# Patient Record
Sex: MALE | Race: WHITE | NOT HISPANIC OR LATINO | URBAN - METROPOLITAN AREA
[De-identification: names, ages, dates, MRNs, and addresses within clinical notes are randomized per-mention and may not be internally consistent; named-entity substitution may affect disease eponyms.]

---

## 2017-12-05 ENCOUNTER — ALLSCRIPTS OFFICE VISIT (OUTPATIENT)
Dept: OTHER | Facility: OTHER | Age: 57
End: 2017-12-05

## 2017-12-05 DIAGNOSIS — Z13.1 ENCOUNTER FOR SCREENING FOR DIABETES MELLITUS: ICD-10-CM

## 2017-12-05 DIAGNOSIS — Z12.5 ENCOUNTER FOR SCREENING FOR MALIGNANT NEOPLASM OF PROSTATE: ICD-10-CM

## 2017-12-05 DIAGNOSIS — M50.322 OTHER CERVICAL DISC DEGENERATION AT C5-C6 LEVEL: ICD-10-CM

## 2017-12-05 DIAGNOSIS — Z13.220 ENCOUNTER FOR SCREENING FOR LIPOID DISORDERS: ICD-10-CM

## 2017-12-05 DIAGNOSIS — Z13.0 ENCOUNTER FOR SCREENING FOR DISEASES OF THE BLOOD AND BLOOD-FORMING ORGANS AND CERTAIN DISORDERS INVOLVING THE IMMUNE MECHANISM: ICD-10-CM

## 2017-12-05 DIAGNOSIS — M54.12 RADICULOPATHY OF CERVICAL REGION: ICD-10-CM

## 2017-12-06 ENCOUNTER — GENERIC CONVERSION - ENCOUNTER (OUTPATIENT)
Dept: OTHER | Facility: OTHER | Age: 57
End: 2017-12-06

## 2017-12-06 ENCOUNTER — ALLSCRIPTS OFFICE VISIT (OUTPATIENT)
Dept: OTHER | Facility: OTHER | Age: 57
End: 2017-12-06

## 2017-12-06 NOTE — PROGRESS NOTES
Assessment    1  Degeneration of C5-C6 intervertebral disc (722 4) (M50 322)   2  Cervical neuritis (723 4) (M54 12)    Plan  Cervical neuritis    · Cyclobenzaprine HCl - 10 MG Oral Tablet; TAKE 1 TAB AT BEDTIME   · PredniSONE 20 MG Oral Tablet; TAKE 3 TABLET Daily take all 3 20mg pills at thesame time TDD:60mg  Cervical neuritis, Degeneration of C5-C6 intervertebral disc    · *1 - [facility]  *  Status: Active  Requested for: 38WBV2125  Care Summary provided  : Yes  Depression screen    · *VB-Depression Screening; Status:Complete;   Done: 47TMM7604 09:39AM   · *VB-Depression Screening ; every 1 year; Last 74CHZ9488; Next 90Mmv4533;Status:Active  PMH: History of influenza vaccination    · Stop: Fluzone Quadrivalent Intramuscular Suspension    Discussion/Summary  Possible side effects of new medications were reviewed with the patient/guardian today  The treatment plan was reviewed with the patient/guardian  The patient/guardian understands and agrees with the treatment plan      Chief Complaint  PT C/O NECK FOR THE FEW WEEKS, WITH PAIN DOWN INTO HIS SHOULDER, ARM AND NUMBNESS AT TIMES  PT IS DUE FOR HIS ROUTINE YEARLY BW  PT HAS DEFERRED HIS FLU SHOT        History of Present Illness  HPI: pt is here c/o neck pain radiating into the left armfirst time he had this problem was 3 years ago - had it for 6 weekspain, nothing helped as far as he remembersended up seeing Pain Management and having an Radha Garcia told he had degenerative disease in C5-C6 of the cervical spinethe time he had the MRI the pain was resolving and he did not end up needing any injectionsdid not do physical therapy  really has been fine for 3 years until recently, he had a mild flare over the summer4 weeksthe pain in the neck and tingling into his fingerswas considerably more mild than when he had a few years ago and was able to ignore itwas gone for about a month but it has come back and now he has had it for 4 weekswith a stiff neckgoes down the left armnumbness in the fingers, at least not as much as prior episodes  heavy suitcases often3 advil every 3 hoursin the car is almost impossibleat night is the worsehelps a tiny bit         Active Problems  1  Abdominal pain (789 00) (R10 9)   2  Cervical neuritis (723 4) (M54 12)   3  Colonoscopy (Fiberoptic) Screening   4  Degeneration of C5-C6 intervertebral disc (722 4) (M50 322)   5  Depression screen (V79 0) (Z13 89)   6  Hemorrhoids (455 6) (K64 9)   7  Lumbar Sprain (847 2)   8  Neck pain (723 1) (M54 2)   9  Nocturia (788 43) (R35 1)    Past Medical History  1  History of Basal cell carcinoma of face (173 31) (C44 310)   2  History of Closed Fracture Of The Left Medial Tibial Plateau (Schatzker 4) (823 00)   3  History of basal cell carcinoma (V10 83) (Z85 828)   4  History of cholelithiasis (V12 79) (Z87 19)   5  History of Need for Tdap vaccination (V06 1) (Z23)   6  History of Ventral hernia (553 20) (K43 9)  Active Problems And Past Medical History Reviewed: The active problems and past medical history were reviewed and updated today  Family History  Mother    1  Family history of Diabetes Mellitus (V18 0)  Father    2  Family history of Brain Cancer (V16 8)   3  Family history of Lung Cancer (V16 1)   4  Family history of Malignant Neoplasm Of The Liver  Family History Reviewed: The family history was reviewed and updated today  Social History   · Being A Social Drinker   · Never a smoker   · Nonsmoker (V49 89) (Z78 9)  The social history was reviewed and updated today  Surgical History    1  History of Cholecystectomy  Surgical History Reviewed: The surgical history was reviewed and updated today  Current Meds   1  Advil 200 MG Oral Tablet; Therapy: (Recorded:53Crd7075) to Recorded    The medication list was reviewed and updated today  Allergies  1  Novocain SOLN   2   Penicillins    Vitals   Recorded: 63THP8387 09:34AM   Temperature 98 2 F   Heart Rate 98   Systolic 703   Diastolic 80   Height 5 ft 9 5 in   Weight 227 lb 12 oz   BMI Calculated 33 15   BSA Calculated 2 19   O2 Saturation 98       Physical Exam   Constitutional  General appearance: No acute distress, well appearing and well nourished  Musculoskeletal  Gait and station: Normal    Inspection/palpation of joints, bones, and muscles: Abnormal  -- (no TTP over the cervical spine but positive tenderness and spasm of the left trapezius muscle, no tenderness over the shoulder with full range of motion of the shoulder, no edema or erythema, full range of motion of the neck in all directions except for tilting his neck to the left, positive Spurling's on the left)  Skin  Skin and subcutaneous tissue: Normal without rashes or lesions  Neurologic  Sensation: No sensory loss  Psychiatric  Orientation to person, place and time: Normal    Mood and affect: Normal          Results/Data  *VB-Depression Screening 91TXZ9529 09:39AM Oralia Inman     Test Name Result Flag Reference   Depression Scale Result      Depression Screen - Negative For Symptoms     PHQ-2 Adult Depression Screening 19QEX3228 09:36AM User, s     Test Name Result Flag Reference   PHQ-2 Adult Depression Score 0       Over the last two weeks, how often have you been bothered by any of the following problems?  Little interest or pleasure in doing things: Not at all - 0 Feeling down, depressed, or hopeless: Not at all - 0   PHQ-2 Adult Depression Screening Negative           Signatures   Electronically signed by : LEONILA Campos ; Dec  5 2017  9:58AM EST                       (Author)

## 2017-12-07 ENCOUNTER — GENERIC CONVERSION - ENCOUNTER (OUTPATIENT)
Dept: OTHER | Facility: OTHER | Age: 57
End: 2017-12-07

## 2017-12-07 LAB
A/G RATIO (HISTORICAL): 1.6 (ref 1.2–2.2)
ALBUMIN SERPL BCP-MCNC: 4.2 G/DL (ref 3.5–5.5)
ALP SERPL-CCNC: 70 IU/L (ref 39–117)
ALT SERPL W P-5'-P-CCNC: 28 IU/L (ref 0–44)
AST SERPL W P-5'-P-CCNC: 21 IU/L (ref 0–40)
BASOPHILS # BLD AUTO: 0 %
BASOPHILS # BLD AUTO: 0 X10E3/UL (ref 0–0.2)
BILIRUB SERPL-MCNC: 0.6 MG/DL (ref 0–1.2)
BUN SERPL-MCNC: 15 MG/DL (ref 6–24)
BUN/CREA RATIO (HISTORICAL): 15 (ref 9–20)
CALCIUM SERPL-MCNC: 9.1 MG/DL (ref 8.7–10.2)
CHLORIDE SERPL-SCNC: 107 MMOL/L (ref 96–106)
CHOLEST SERPL-MCNC: 229 MG/DL (ref 100–199)
CO2 SERPL-SCNC: 24 MMOL/L (ref 18–29)
CREAT SERPL-MCNC: 1.02 MG/DL (ref 0.76–1.27)
DEPRECATED RDW RBC AUTO: 13.5 % (ref 12.3–15.4)
EGFR AFRICAN AMERICAN (HISTORICAL): 93 ML/MIN/1.73
EGFR-AMERICAN CALC (HISTORICAL): 80 ML/MIN/1.73
EOSINOPHIL # BLD AUTO: 0.1 X10E3/UL (ref 0–0.4)
EOSINOPHIL # BLD AUTO: 1 %
GLUCOSE SERPL-MCNC: 91 MG/DL (ref 65–99)
HCT VFR BLD AUTO: 45.8 % (ref 37.5–51)
HDLC SERPL-MCNC: 62 MG/DL
HGB BLD-MCNC: 15.9 G/DL (ref 13–17.7)
IMM.GRANULOCYTES (CD4/8) (HISTORICAL): 0 %
IMM.GRANULOCYTES (CD4/8) (HISTORICAL): 0 X10E3/UL (ref 0–0.1)
LDLC SERPL CALC-MCNC: 146 MG/DL (ref 0–99)
LYMPHOCYTES # BLD AUTO: 2.5 X10E3/UL (ref 0.7–3.1)
LYMPHOCYTES # BLD AUTO: 20 %
MCH RBC QN AUTO: 31 PG (ref 26.6–33)
MCHC RBC AUTO-ENTMCNC: 34.7 G/DL (ref 31.5–35.7)
MCV RBC AUTO: 89 FL (ref 79–97)
MONOCYTES # BLD AUTO: 1.1 X10E3/UL (ref 0.1–0.9)
MONOCYTES (HISTORICAL): 9 %
NEUTROPHILS # BLD AUTO: 70 %
NEUTROPHILS # BLD AUTO: 8.6 X10E3/UL (ref 1.4–7)
PLATELET # BLD AUTO: 245 X10E3/UL (ref 150–379)
POTASSIUM SERPL-SCNC: 4 MMOL/L (ref 3.5–5.2)
PROSTATE SPECIFIC ANTIGEN (HISTORICAL): 1.9 NG/ML (ref 0–4)
RBC (HISTORICAL): 5.13 X10E6/UL (ref 4.14–5.8)
SODIUM SERPL-SCNC: 146 MMOL/L (ref 134–144)
TOT. GLOBULIN, SERUM (HISTORICAL): 2.7 G/DL (ref 1.5–4.5)
TOTAL PROTEIN (HISTORICAL): 6.9 G/DL (ref 6–8.5)
TRIGL SERPL-MCNC: 107 MG/DL (ref 0–149)
VLDLC SERPL CALC-MCNC: 21 MG/DL (ref 5–40)
WBC # BLD AUTO: 12.5 X10E3/UL (ref 3.4–10.8)

## 2018-01-12 NOTE — RESULT NOTES
Verified Results  (1) CBC/PLT/DIFF 20ZBN9944 12:00AM [facility]     Test Name Result Flag Reference   WBC 5 5 x10E3/uL  3 4-10 8   RBC 5 27 x10E6/uL  4 14-5 80   Hemoglobin 15 7 g/dL  12 6-17 7   Hematocrit 47 4 %  37 5-51 0   MCV 90 fL  79-97   MCH 29 8 pg  26 6-33 0   MCHC 33 1 g/dL  31 5-35 7   RDW 13 3 %  12 3-15 4   Platelets 879 V56S4/SO  150-379   Neutrophils 58 %     Lymphs 25 %     Monocytes 12 %     Eos 5 %     Basos 0 %     Neutrophils (Absolute) 3 1 x10E3/uL  1 4-7 0   Lymphs (Absolute) 1 4 x10E3/uL  0 7-3 1   Monocytes(Absolute) 0 7 x10E3/uL  0 1-0 9   Eos (Absolute) 0 3 x10E3/uL  0 0-0 4   Baso (Absolute) 0 0 x10E3/uL  0 0-0 2   Immature Granulocytes 0 %     Immature Grans (Abs) 0 0 x10E3/uL  0 0-0 1     (1) PSA (SCREEN) (Dx V76 44 Screen for Prostate Cancer) 47JZT5621 12:00AM [facility]     Test Name Result Flag Reference   Prostate Specific Ag, Serum 2 5 ng/mL  0 0-4 0   Roche ECLIA methodology  According to the American Urological Association, Serum PSA should  decrease and remain at undetectable levels after radical  prostatectomy  The AUA defines biochemical recurrence as an initial  PSA value 0 2 ng/mL or greater followed by a subsequent confirmatory  PSA value 0 2 ng/mL or greater  Values obtained with different assay methods or kits cannot be used  interchangeably  Results cannot be interpreted as absolute evidence  of the presence or absence of malignant disease  (1) TSH 55MHM0697 12:00AM [facility]     Test Name Result Flag Reference   TSH 2 660 uIU/mL  0 450-4 500       Plan  Depression screen    · *VB-Depression Screening; Status:Complete;   Done: 53YOJ2391 10:08AM  Long term use of drug, Screening for diabetes mellitus, Screening for endocrine,  nutritional, metabolic and immunity disorder, Screening for lipoid disorders, Screening  for thyroid disorder, Special screening, prostate cancer    · (1) LIPID PANEL, FASTING; Status: In Progress - Specimen/Data Collected; Done:  85RXS1799   · (1) TSH; Status:Complete;   Done: 16LTO6102 12:00AM  Need for influenza vaccination    · Fluzone Quadrivalent Intramuscular Suspension; INJECT 0 5  ML  Intramuscular; To Be Done: 79YDH8295  Need for Tdap vaccination    · Tdap (Adacel)  Nocturia, Screening for diabetes mellitus, Screening for endocrine, nutritional, metabolic  and immunity disorder, Screening for lipoid disorders, Screening for thyroid disorder,  Special screening, prostate cancer    · (1) CBC/PLT/DIFF; Status:Resulted - Requires Verification;   Done: 86WLC5610 12:00AM  Nocturia, Urinary frequency    · Urine Dip Non-Automated- POC; Status:Complete;   Done: 41LNR9565 09:31AM   · (1) URINALYSIS w URINE C/S REFLEX (will reflex a microscopy if leukocytes, occult  blood, or nitrites are not within normal limits); Status: In Progress - Specimen/Data  Collected;   Done: 71QCK9714  Screening for diabetes mellitus, Screening for endocrine, nutritional, metabolic and  immunity disorder, Screening for lipoid disorders, Screening for thyroid disorder, Special  screening, prostate cancer    · (1) COMPREHENSIVE METABOLIC PANEL; Status: In Progress - Specimen/Data  Collected;   Done: 27RDS1662   · (1) PSA (SCREEN) (Dx V76 44 Screen for Prostate Cancer); Status:Resulted - Requires  Verification;   Done: 05IZD5277 12:00AM  Screening for diabetes mellitus, Screening for lipoid disorders, Screening for thyroid  disorder, Special screening, prostate cancer    · Routine Venipuncture - POC; Status:Complete;   Done: 81HTU8131    Discussion/Summary   cbc ,thyroid, and PSA are normal   others are still pending

## 2018-01-13 NOTE — RESULT NOTES
Verified Results  (1) COMPREHENSIVE METABOLIC PANEL 54GNL6777 92:29KH Taniya Lizarraga     Test Name Result Flag Reference   Glucose, Serum 105 mg/dL H 65-99   BUN 13 mg/dL  6-24   Creatinine, Serum 1 04 mg/dL  0 76-1 27   eGFR If NonAfricn Am 79 mL/min/1 73  >59   eGFR If Africn Am 91 mL/min/1 73  >59   BUN/Creatinine Ratio 13  9-20   Sodium, Serum 144 mmol/L  136-144   Potassium, Serum 4 5 mmol/L  3 5-5 2   Chloride, Serum 106 mmol/L     Carbon Dioxide, Total 22 mmol/L  18-29   Calcium, Serum 8 6 mg/dL L 8 7-10 2   Protein, Total, Serum 6 9 g/dL  6 0-8 5   Albumin, Serum 4 4 g/dL  3 5-5 5   Globulin, Total 2 5 g/dL  1 5-4 5   A/G Ratio 1 8  1 1-2 5   Bilirubin, Total 0 7 mg/dL  0 0-1 2   Alkaline Phosphatase, S 73 IU/L     AST (SGOT) 31 IU/L  0-40   ALT (SGPT) 32 IU/L  0-44     (1) LIPID PANEL, FASTING 90PGP9660 12:00AM Taniya Lizarraga     Test Name Result Flag Reference   Cholesterol, Total 214 mg/dL H 100-199   Triglycerides 116 mg/dL  0-149   HDL Cholesterol 55 mg/dL  >39   According to ATP-III Guidelines, HDL-C >59 mg/dL is considered a  negative risk factor for CHD  VLDL Cholesterol Dann 23 mg/dL  5-40   LDL Cholesterol Calc 136 mg/dL H 0-99   T  Chol/HDL Ratio 3 9 ratio units  0 0-5 0   T  Chol/HDL Ratio                                                             Men  Women                                               1/2 Avg  Risk  3 4    3 3                                                   Avg Risk  5 0    4 4                                                2X Avg  Risk  9 6    7 1                                                3X Avg  Risk 23 4   11 0       Discussion/Summary   sugar and chol are slightly elevated  watch diet and recheck labs in 3 months

## 2018-01-14 NOTE — PROGRESS NOTES
Assessment    1  Urinary frequency (788 41) (R35 0)   2  Nocturia (788 43) (R35 1)   3  Nonsmoker (V49 89) (Z78 9)   4  Encounter for preventive health examination (V70 0) (Z00 00)    Plan  Nocturia, Urinary frequency    · Urine Dip Non-Automated- POC; Status:Active - Perform Order; Requested  for:07Nov2016;     Discussion/Summary  Impression: healthy adult male  Currently, he eats a poor diet and has an adequate exercise regimen  Prostate cancer screening: PSA was ordered  Testicular cancer screening: testicular cancer screening is current  Colorectal cancer screening: colorectal cancer screening is current  The immunizations are needed and Adacel  He was advised to be evaluated by an optometrist and Dermatology  Advice and education were given regarding nutrition, weight loss and Low-carb diet  He's encouraged to see the eye doctor and the dermatologist  He'll have fasting blood work here today  Continued weight loss via exercise is encouraged  He received an update of his Adacel here today  Chief Complaint  PT IS HERE FOR YEARLY WELLNESS EXAM AND FBW  PT IS DUE FOR TDAP  DEFERS FLU VACCINE  PT IS COMPLETING URINARY AND DEPRESSION SCREENING  NON-SMOKER  Advance Directives  Advance Directive [facility]:   The patient is not in agreement to receive the Advance Care Planning service    NO - Patient does not have an advance health care directive  Capacity/Competence: This patient has full decision making capacity for discussion of advance care planning and This patient has full decision making competency for discussion of advance care planning  Summary of Advance Directive Conversation  paperwork given  History of Present Illness  HM, Adult Male: The patient is being seen for a health maintenance evaluation  General Health: The patient's health since the last visit is described as fair  He has regular dental visits  He denies vision problems  He denies hearing loss   Immunizations status: not up to date  Lifestyle:  He consumes a diverse and healthy diet  He has weight concerns  He exercises regularly  He does not use tobacco  He consumes alcohol  He denies drug use  Screening: cancer screening reviewed and current  metabolic screening reviewed and current  risk screening reviewed and current  HPI: She is here for well exam  He is noticing some pulling and spasticity of the left testicle with ejaculation  He notes urinary frequency and a dry mouth  He's not been to the dermatologist since he had several lesions removed 5 years ago  He's currently with his colonoscopy  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 and reports that he got favorable readings  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  Review of Systems    Constitutional: No fever or chills, feels well, no tiredness, no recent weight gain or weight loss  Eyes: No complaints of eye pain, no red eyes, no discharge from eyes, no itchy eyes  ENT: no complaints of earache, no hearing loss, no nosebleeds, no nasal discharge, no sore throat, no hoarseness  Cardiovascular: No complaints of slow heart rate, no fast heart rate, no chest pain, no palpitations, no leg claudication, no lower extremity  Respiratory: No complaints of shortness of breath, no wheezing, no cough, no SOB on exertion, no orthopnea or PND  Gastrointestinal: No complaints of abdominal pain, no constipation, no nausea or vomiting, no diarrhea or bloody stools  Genitourinary: No complaints of dysuria, no incontinence, no hesitancy, no nocturia, no genital lesion, no testicular pain  Musculoskeletal: No complaints of arthralgia, no myalgias, no joint swelling or stiffness, no limb pain or swelling  Integumentary: No complaints of skin rash or skin lesions, no itching, no skin wound, no dry skin     Neurological: No compliants of headache, no confusion, no convulsions, no numbness or tingling, no dizziness or fainting, no limb weakness, no difficulty walking  Psychiatric: Is not suicidal, no sleep disturbances, no anxiety or depression, no change in personality, no emotional problems  Endocrine: No complaints of proptosis, no hot flashes, no muscle weakness, no erectile dysfunction, no deepening of the voice, no feelings of weakness  Hematologic/Lymphatic: No complaints of swollen glands, no swollen glands in the neck, does not bleed easily, no easy bruising  Active Problems    1  Abdominal pain (789 00) (R10 9)   2  Cervical Disc Degeneration C5 - C6 (722 4)   3  Cervical neuritis (723 4) (M54 12)   4  Colonoscopy (Fiberoptic) Screening   5  Hemorrhoids (455 6) (K64 9)   6  Long term use of drug (V58 69) (Z79 899)   7  Lumbar Sprain (847 2)   8  Neck pain (723 1) (M54 2)   9  Screening for diabetes mellitus (V77 1) (Z13 1)   10  Screening for endocrine, nutritional, metabolic and immunity disorder (V77 99)    (Z13 29,Z13 0,Z13 21,Z13 228)   11  Screening for lipoid disorders (V77 91) (Z13 220)   12  Screening for prostate cancer (V76 44) (Z12 5)   13  Screening for thyroid disorder (V77 0) (Z13 29)   14  Special screening for malignant neoplasm of colon (V76 51) (Z12 11)    Past Medical History    · History of Basal cell carcinoma of face (173 31) (C44 310)   · History of Closed Fracture Of The Left Medial Tibial Plateau (Schatzker 4) (823 00)   · History of basal cell carcinoma (V10 83) (Z85 828)   · History of cholelithiasis (V12 79) (Z87 19)   · History of Ventral hernia (553 20) (K43 9)    Surgical History    · History of Cholecystectomy    Family History  Mother    · Family history of Diabetes Mellitus (V18 0)  Father    · Family history of Brain Cancer (V16 8)   · Family history of Lung Cancer (V16 1)   · Family history of Malignant Neoplasm Of The Liver    Social History    · Being A Social Drinker   · Never A Smoker   · Nonsmoker (V49 89) (Z78 9)    Current Meds   1   Advil 200 MG Oral Tablet; Therapy: (Recorded:98Snd9838) to Recorded   2  DiazePAM 10 MG Oral Tablet; TAKE 1 TABLET 3 TIMES DAILY; Therapy: 07LPH0644 to (Evaluate:14Apr2014); Last Rx:04Apr2014 Ordered   3  Hydrocortisone 2 5 % Rectal Cream; APPLY TO AFFECTED AREA TWICE DAILY; Therapy: 48GKC4180 to (Last Rx:56Rcc7131)  Requested for: 44Quo9081 Ordered   4  LevoFLOXacin 500 MG Oral Tablet; Take 1 daily; Therapy: 68WVB4440 to (Last Rx:04Jun2014) Ordered   5  MetroNIDAZOLE 500 MG Oral Tablet; TAKE 1 TABLET TWICE DAILY UNTIL FINISHED; Therapy: 00MFP2659 to (Evaluate:14Jun2014); Last Rx:04Jun2014 Ordered   6  Tylenol 500 MG CAPS; Therapy: (Recorded:52Rdr4823) to Recorded    Allergies    1  Penicillins    Vitals   Recorded: 27GBN1875 63:78AK   Systolic 912   Diastolic 78   Heart Rate 92   Temperature 98 5 F   O2 Saturation 99   Height 5 ft 9 5 in   Weight 232 lb    BMI Calculated 33 77   BSA Calculated 2 21     Physical Exam    Constitutional   General appearance: No acute distress, well appearing and well nourished  Eyes   Conjunctiva and lids: No swelling, erythema, or discharge  Pupils and irises: Equal, round and reactive to light  Ears, Nose, Mouth, and Throat   External inspection of ears and nose: Normal     Otoscopic examination: Tympanic membrance translucent with normal light reflex  Canals patent without erythema  Oropharynx: Normal with no erythema, edema, exudate or lesions  Pulmonary   Respiratory effort: No increased work of breathing or signs of respiratory distress  Auscultation of lungs: Clear to auscultation  Cardiovascular   Palpation of heart: Normal PMI, no thrills  Auscultation of heart: Normal rate and rhythm, normal S1 and S2, without murmurs  Examination of extremities for edema and/or varicosities: Normal     Abdomen   Abdomen: Abnormal   Prostate is soft smooth and not enlarged  Testicular exam is normal    Liver and spleen: No hepatomegaly or splenomegaly      Lymphatic   Palpation of lymph nodes in neck: No lymphadenopathy  Musculoskeletal   Gait and station: Normal     Digits and nails: Normal without clubbing or cyanosis  Inspection/palpation of joints, bones, and muscles: Normal     Skin   Skin and subcutaneous tissue: Normal without rashes or lesions  Neurologic   Cranial nerves: Cranial nerves 2-12 intact  Reflexes: 2+ and symmetric  Sensation: No sensory loss  Psychiatric   Orientation to person, place and time: Normal     Mood and affect: Normal        Health Management  Special screening for malignant neoplasm of colon   COLONOSCOPY; every 10 years; Last 08UUY5529; Next Due: [id number];  Active    Signatures   Electronically signed by : Tej Falcon DO; Nov 7 2016 10:05AM EST                       (Author)

## 2018-01-23 VITALS
HEART RATE: 98 BPM | OXYGEN SATURATION: 98 % | SYSTOLIC BLOOD PRESSURE: 130 MMHG | HEIGHT: 70 IN | DIASTOLIC BLOOD PRESSURE: 80 MMHG | BODY MASS INDEX: 32.61 KG/M2 | WEIGHT: 227.75 LBS | TEMPERATURE: 98.2 F

## 2018-01-23 NOTE — RESULT NOTES
Discussion/Summary   Please let the patient know that I have his results back from his blood work, everything was basically normal except for his LDL cholesterol which was slightly elevated at 146, should be less than 130  He should work on a low-fat, low-cholesterol diet  We can recheck this in 6-12 months  The only other thing that was abnormal was his white blood cell count and the kind of white blood cells that were high can be elevated due to inflammation  Given that he saw me for significant inflammation in his neck I am not sure there is anything further to do about this at this time but I would like to repeat it in 6 weeks just to be sure that there is no other cause  I will order the blood test, please schedule him so he gets a reminder to come in and have the lab redone  He does not need to be fasting for this  Verified Results  (1) CBC/PLT/DIFF 55YIE2237 12:00AM Gib Loots     Test Name Result Flag Reference   WBC 12 5 x10E3/uL H 3 4-10 8   RBC 5 13 x10E6/uL  4 14-5 80   Hemoglobin 15 9 g/dL  13 0-17 7   **Please note reference interval change**   Hematocrit 45 8 %  37 5-51 0   MCV 89 fL  79-97   MCH 31 0 pg  26 6-33 0   MCHC 34 7 g/dL  31 5-35 7   RDW 13 5 %  12 3-15 4   Platelets 264 H57V7/BA  150-379   Neutrophils 70 %  Not Estab  Lymphs 20 %  Not Estab  Monocytes 9 %  Not Estab  Eos 1 %  Not Estab  Basos 0 %  Not Estab  Neutrophils (Absolute) 8 6 x10E3/uL H 1 4-7 0   Lymphs (Absolute) 2 5 x10E3/uL  0 7-3 1   Monocytes(Absolute) 1 1 x10E3/uL H 0 1-0 9   Eos (Absolute) 0 1 x10E3/uL  0 0-0 4   Baso (Absolute) 0 0 x10E3/uL  0 0-0 2   Immature Granulocytes 0 %  Not Estab  Immature Grans (Abs) 0 0 x10E3/uL  0 0-0 1       Plan  Cervical neuritis    · (1) CBC/ PLT (NO DIFF); Status:Active;  Requested for:43Wgr4306;

## 2018-06-19 ENCOUNTER — OFFICE VISIT (OUTPATIENT)
Dept: FAMILY MEDICINE CLINIC | Facility: CLINIC | Age: 58
End: 2018-06-19
Payer: COMMERCIAL

## 2018-06-19 VITALS
BODY MASS INDEX: 33.75 KG/M2 | WEIGHT: 235.75 LBS | DIASTOLIC BLOOD PRESSURE: 98 MMHG | OXYGEN SATURATION: 97 % | HEIGHT: 70 IN | SYSTOLIC BLOOD PRESSURE: 158 MMHG | TEMPERATURE: 98.2 F | HEART RATE: 82 BPM

## 2018-06-19 DIAGNOSIS — J01.00 ACUTE NON-RECURRENT MAXILLARY SINUSITIS: Primary | ICD-10-CM

## 2018-06-19 DIAGNOSIS — E78.2 MIXED HYPERLIPIDEMIA: ICD-10-CM

## 2018-06-19 DIAGNOSIS — J20.9 ACUTE BRONCHITIS, UNSPECIFIED ORGANISM: ICD-10-CM

## 2018-06-19 DIAGNOSIS — R73.01 ELEVATED FASTING GLUCOSE: ICD-10-CM

## 2018-06-19 DIAGNOSIS — E66.9 OBESITY (BMI 30.0-34.9): ICD-10-CM

## 2018-06-19 DIAGNOSIS — R03.0 ELEVATED BLOOD PRESSURE READING WITHOUT DIAGNOSIS OF HYPERTENSION: ICD-10-CM

## 2018-06-19 DIAGNOSIS — Z12.5 SCREENING PSA (PROSTATE SPECIFIC ANTIGEN): ICD-10-CM

## 2018-06-19 DIAGNOSIS — Z11.59 ENCOUNTER FOR HEPATITIS C SCREENING TEST FOR LOW RISK PATIENT: ICD-10-CM

## 2018-06-19 DIAGNOSIS — Z13.0 SCREENING, ANEMIA, DEFICIENCY, IRON: ICD-10-CM

## 2018-06-19 DIAGNOSIS — R35.1 NOCTURIA: ICD-10-CM

## 2018-06-19 DIAGNOSIS — Z13.29 SCREENING FOR THYROID DISORDER: ICD-10-CM

## 2018-06-19 PROCEDURE — 99214 OFFICE O/P EST MOD 30 MIN: CPT | Performed by: FAMILY MEDICINE

## 2018-06-19 PROCEDURE — 3008F BODY MASS INDEX DOCD: CPT | Performed by: FAMILY MEDICINE

## 2018-06-19 PROCEDURE — 1036F TOBACCO NON-USER: CPT | Performed by: FAMILY MEDICINE

## 2018-06-19 RX ORDER — CLINDAMYCIN HYDROCHLORIDE 150 MG/1
CAPSULE ORAL
Refills: 0 | COMMUNITY
Start: 2018-05-18 | End: 2021-05-03

## 2018-06-19 RX ORDER — IBUPROFEN 200 MG
TABLET ORAL
COMMUNITY

## 2018-06-19 RX ORDER — PROMETHAZINE HYDROCHLORIDE AND CODEINE PHOSPHATE 6.25; 1 MG/5ML; MG/5ML
5 SYRUP ORAL EVERY 4 HOURS PRN
Qty: 120 ML | Refills: 0 | Status: SHIPPED | OUTPATIENT
Start: 2018-06-19 | End: 2021-05-03

## 2018-06-19 RX ORDER — CEFUROXIME AXETIL 500 MG/1
500 TABLET ORAL EVERY 12 HOURS SCHEDULED
Qty: 20 TABLET | Refills: 0 | Status: SHIPPED | OUTPATIENT
Start: 2018-06-19 | End: 2018-06-29

## 2018-06-19 RX ORDER — BENZONATATE 200 MG/1
200 CAPSULE ORAL 3 TIMES DAILY PRN
Qty: 30 CAPSULE | Refills: 0 | Status: SHIPPED | OUTPATIENT
Start: 2018-06-19 | End: 2018-06-22 | Stop reason: SDUPTHER

## 2018-06-19 RX ORDER — PREDNISONE 20 MG/1
TABLET ORAL DAILY
COMMUNITY
Start: 2017-12-05 | End: 2021-05-03

## 2018-06-19 RX ORDER — CYCLOBENZAPRINE HCL 10 MG
1 TABLET ORAL
COMMUNITY
Start: 2017-12-05 | End: 2021-05-03

## 2018-06-19 NOTE — PROGRESS NOTES
Assessment/Plan:    Elevated blood pressure reading without diagnosis of hypertension  The patient's blood pressure is significantly elevated today  He is sick, not sleeping well, as well as taking over-the-counter cold medication  His wife is a nurse and so he is going to ask her to take it at home to follow-up  I advised heat check it about once a week and when he returns for his physical if his levels are still high we can discuss medication  Obesity (BMI 30 0-34  9)  The patient is going to work on diet and exercise for weight loss  He will get his labs to the end of the summer after which he will follow up for routine health maintenance exam     Nocturia  The patient does not have significant nocturia as long as he does not drink water in the evening  He likely has some mild BPH but has had a normal PSA and prostate exam last year  We discussed that if this persists and becomes troublesome for him there medications to help with BPH  Diagnoses and all orders for this visit:    Acute non-recurrent maxillary sinusitis  -     cefuroxime (CEFTIN) 500 mg tablet; Take 1 tablet (500 mg total) by mouth every 12 (twelve) hours for 10 days    I suspect that the patient has a bacterial sinusitis  I prescribed antibiotics and encouraged medication for sx relief  I did rx cough meds  Rest and fluids encouraged as well  Acute bronchitis, unspecified organism  -     promethazine-codeine (PHENERGAN WITH CODEINE) 6 25-10 mg/5 mL syrup; Take 5 mL by mouth every 4 (four) hours as needed for cough  -     benzonatate (TESSALON) 200 MG capsule; Take 1 capsule (200 mg total) by mouth 3 (three) times a day as needed for cough    Elevated blood pressure reading without diagnosis of hypertension    Nocturia    Obesity (BMI 30 0-34  9)    Mixed hyperlipidemia  -     Lipid Panel with Direct LDL reflex; Future  -     TSH, 3rd generation with Free T4 reflex;  Future  -     Lipid Panel with Direct LDL reflex  -     TSH, 3rd generation with Free T4 reflex    Elevated fasting glucose  -     Comprehensive metabolic panel; Future  -     Hemoglobin A1C; Future  -     Comprehensive metabolic panel  -     Hemoglobin A1C    Screening, anemia, deficiency, iron  -     CBC and differential; Future  -     CBC and differential    Screening for thyroid disorder  -     TSH, 3rd generation with Free T4 reflex; Future  -     TSH, 3rd generation with Free T4 reflex    Screening PSA (prostate specific antigen)  -     PSA, ultrasensitive; Future  -     PSA, ultrasensitive    Encounter for hepatitis C screening test for low risk patient  -     Hepatitis C antibody; Future  -     Hepatitis C antibody              Subjective:      Patient ID: Avelino Ritter is a 59 y o  male      The pt is here because he has been sick for 5 days  + sinus pain/pressure  + ear pain and pressure  + cough - very dry, nothing coming up  + nasal congestion  + headaches  + PND  + sore throat - sometimes so sore it hurts to swallow  No fevers  Right before the started he had just gotten back from a 3 week trip to [facility], he got off the plane the same day that his symptoms started  His worse symptom seems to be a sore throat    He says he is due for routine health maintenance visit  He is due for labs  He is still experiencing nocturia and definitely wants to be sure he is tested for diabetes  He has had a fasting sugar that was slightly elevated in the past  He gained between 10 and 15 lb over his 3 week trip but knows he was eating a lot during that time  He really wants to work on weight loss and was planning to be down to 200 lb by the time he was 59  Now he is not so sure that is going to happen but he still wants to work on losing weight          The following portions of the patient's history were reviewed and updated as appropriate: allergies, current medications, past family history, past medical history, past social history, past surgical history and problem list     Review of Systems      Objective:  Vitals:    06/19/18 0950   BP: 158/98   Pulse: 82   Temp: 98 2 °F (36 8 °C)   SpO2: 97%   Weight: 107 kg (235 lb 12 oz)   Height: 5' 10" (1 778 m)     Body mass index is 33 83 kg/m²  Physical Exam   Constitutional: He is oriented to person, place, and time  Vital signs are normal  He appears well-developed and well-nourished  He appears ill  HENT:   Head: Normocephalic and atraumatic  Right Ear: Tympanic membrane and external ear normal    Left Ear: Tympanic membrane and external ear normal    Nose: Mucosal edema and sinus tenderness present  No rhinorrhea  Right sinus exhibits maxillary sinus tenderness and frontal sinus tenderness  Left sinus exhibits maxillary sinus tenderness and frontal sinus tenderness  Mouth/Throat: Mucous membranes are normal  Posterior oropharyngeal erythema present  No oropharyngeal exudate, posterior oropharyngeal edema or tonsillar abscesses  Eyes: Conjunctivae and lids are normal    Pulmonary/Chest: Effort normal and breath sounds normal    Lymphadenopathy:     He has cervical adenopathy  Neurological: He is alert and oriented to person, place, and time  Skin: Skin is warm, dry and intact  Psychiatric: He has a normal mood and affect   Thought content normal

## 2018-06-19 NOTE — PATIENT INSTRUCTIONS
Rhinosinusitis   WHAT YOU NEED TO KNOW:   Rhinosinusitis (RS) is inflammation of your nose and sinuses  It commonly begins as a virus, often as a common cold  Viruses usually last 7 to 10 days and do not need treatment  When the virus does not get better on its own, you may have bacterial RS  This means that bacteria have begun to grow inside your sinuses  Acute RS lasts less than 4 weeks  Chronic RS lasts 12 weeks or more  Recurrent RS is when you have 4 or more episodes of RS in one year  DISCHARGE INSTRUCTIONS:   Return to the emergency department if:   · Your eye and eyelid are red, swollen, and painful  · You cannot open your eye  · You have double vision or you cannot see  · Your eyeball bulges out or you cannot move your eye  · You are more sleepy than normal or you notice changes in your ability to think, move, or talk  · You have a stiff neck, a fever, or a bad headache  · You have swelling of your forehead or scalp  Contact your healthcare provider if:   · Your symptoms are worse or do not improve after 3 to 5 days of treatment  · You have questions or concerns about your condition or care  Medicines: You may need any of the following:  · Acetaminophen  decreases pain and fever  It is available without a doctor's order  Ask how much to take and how often to take it  Follow directions  Acetaminophen can cause liver damage if not taken correctly  · NSAIDs , such as ibuprofen, help decrease swelling, pain, and fever  This medicine is available with or without a doctor's order  NSAIDs can cause stomach bleeding or kidney problems in certain people  If you take blood thinner medicine, always ask your healthcare provider if NSAIDs are safe for you  Always read the medicine label and follow directions  · Nasal steroid sprays  decrease inflammation in your nose and sinuses  · Decongestants  reduce swelling and drain mucus in the nose and sinuses   They may help you breathe easier  · Antihistamines  dry mucus in the nose and relieve sneezing  · Antibiotics  treat a bacterial infection and may be needed if your symptoms do not improve or they get worse  · Take your medicine as directed  Contact your healthcare provider if you think your medicine is not helping or if you have side effects  Tell him or her if you are allergic to any medicine  Keep a list of the medicines, vitamins, and herbs you take  Include the amounts, and when and why you take them  Bring the list or the pill bottles to follow-up visits  Carry your medicine list with you in case of an emergency  Self-care:   · Rinse your sinuses  Use a sinus rinse device to rinse your nasal passages with a saline (salt water) solution  This will help thin the mucus in your nose and rinse away pollen and dirt  It will also help reduce swelling so you can breathe normally  Ask your healthcare provider how often to do this  · Breathe in steam   Heat a bowl of water until you see steam  Lean over the bowl and make a tent over your head with a large towel  Breathe deeply for about 20 minutes  Be careful not to get too close to the steam or burn yourself  Do this 3 times a day  You can also breathe deeply when you take a hot shower  · Sleep with your head elevated  Place an extra pillow under your head before you go to sleep to help your sinuses drain  · Drink liquids as directed  Ask your healthcare provider how much liquid to drink each day and which liquids are best for you  Liquids will thin the mucus in your nose and help it drain  Avoid drinks that contain alcohol or caffeine  · Do not smoke, and avoid secondhand smoke  Nicotine and other chemicals in cigarettes and cigars can make your symptoms worse  Ask your healthcare provider for information if you currently smoke and need help to quit  E-cigarettes or smokeless tobacco still contain nicotine   Talk to your healthcare provider before you use these products  Follow up with your healthcare provider as directed: Follow up if your symptoms are worse or not better after 3 to 5 days of treatment  Write down your questions so you remember to ask them during your visits  © 2017 [address] Information is for End User's use only and may not be sold, redistributed or otherwise used for commercial purposes  All illustrations and images included in CareNotes® are the copyrighted property of A D A M , Inc  or [address]  The above information is an  only  It is not intended as medical advice for individual conditions or treatments  Talk to your doctor, nurse or pharmacist before following any medical regimen to see if it is safe and effective for you

## 2018-06-19 NOTE — ASSESSMENT & PLAN NOTE
The patient is going to work on diet and exercise for weight loss    He will get his labs to the end of the summer after which he will follow up for routine health maintenance exam

## 2018-06-19 NOTE — ASSESSMENT & PLAN NOTE
The patient does not have significant nocturia as long as he does not drink water in the evening  He likely has some mild BPH but has had a normal PSA and prostate exam last year  We discussed that if this persists and becomes troublesome for him there medications to help with BPH

## 2018-06-19 NOTE — ASSESSMENT & PLAN NOTE
The patient's blood pressure is significantly elevated today  He is sick, not sleeping well, as well as taking over-the-counter cold medication  His wife is a nurse and so he is going to ask her to take it at home to follow-up  I advised heat check it about once a week and when he returns for his physical if his levels are still high we can discuss medication

## 2018-06-22 ENCOUNTER — TELEPHONE (OUTPATIENT)
Dept: FAMILY MEDICINE CLINIC | Facility: CLINIC | Age: 58
End: 2018-06-22

## 2018-06-22 DIAGNOSIS — J20.9 ACUTE BRONCHITIS, UNSPECIFIED ORGANISM: ICD-10-CM

## 2018-06-22 RX ORDER — BENZONATATE 200 MG/1
200 CAPSULE ORAL 3 TIMES DAILY PRN
Qty: 30 CAPSULE | Refills: 0 | Status: SHIPPED | OUTPATIENT
Start: 2018-06-22 | End: 2021-05-03

## 2018-06-22 NOTE — TELEPHONE ENCOUNTER
Fern Aguirre was at a golf outing yesterday with the benzonatate with him and it melted, then he tried to put in freezer, they were destroyed  He is leaving for [place] on Sunday  Can you send in another rx to [facility]

## 2019-01-25 ENCOUNTER — TELEPHONE (OUTPATIENT)
Dept: FAMILY MEDICINE CLINIC | Facility: CLINIC | Age: 59
End: 2019-01-25

## 2019-01-25 NOTE — TELEPHONE ENCOUNTER
Has another cancer spot on his face  He did not remember where you sent him about 10 years ago for a plastic surgen  Can he have the name and number?

## 2019-10-01 ENCOUNTER — TELEPHONE (OUTPATIENT)
Dept: FAMILY MEDICINE CLINIC | Facility: CLINIC | Age: 59
End: 2019-10-01

## 2019-10-01 DIAGNOSIS — Z11.59 ENCOUNTER FOR HEPATITIS C SCREENING TEST FOR LOW RISK PATIENT: ICD-10-CM

## 2019-10-01 DIAGNOSIS — Z12.5 SCREENING FOR PROSTATE CANCER: ICD-10-CM

## 2019-10-01 DIAGNOSIS — Z13.29 SCREENING FOR THYROID DISORDER: ICD-10-CM

## 2019-10-01 DIAGNOSIS — Z13.220 SCREENING FOR LIPID DISORDERS: ICD-10-CM

## 2019-10-01 DIAGNOSIS — Z13.0 SCREENING, ANEMIA, DEFICIENCY, IRON: Primary | ICD-10-CM

## 2019-10-01 DIAGNOSIS — Z13.1 SCREENING FOR DIABETES MELLITUS: ICD-10-CM

## 2019-10-01 NOTE — TELEPHONE ENCOUNTER
ATTEMPT #1    Please call the patient and schedule a routine physical/health maintenance visit  I ordered routine blood work to [facility]   The pt should have the blood work done prior to the visit

## 2021-04-08 DIAGNOSIS — Z23 ENCOUNTER FOR IMMUNIZATION: ICD-10-CM

## 2021-05-03 ENCOUNTER — OFFICE VISIT (OUTPATIENT)
Dept: FAMILY MEDICINE CLINIC | Facility: CLINIC | Age: 61
End: 2021-05-03
Payer: COMMERCIAL

## 2021-05-03 VITALS
HEART RATE: 83 BPM | BODY MASS INDEX: 36.07 KG/M2 | SYSTOLIC BLOOD PRESSURE: 130 MMHG | WEIGHT: 243.5 LBS | HEIGHT: 69 IN | TEMPERATURE: 98.6 F | DIASTOLIC BLOOD PRESSURE: 86 MMHG | OXYGEN SATURATION: 97 %

## 2021-05-03 DIAGNOSIS — Z13.220 SCREENING FOR LIPID DISORDERS: ICD-10-CM

## 2021-05-03 DIAGNOSIS — Z13.29 SCREENING FOR THYROID DISORDER: ICD-10-CM

## 2021-05-03 DIAGNOSIS — Z12.11 SCREEN FOR COLON CANCER: ICD-10-CM

## 2021-05-03 DIAGNOSIS — Z11.59 ENCOUNTER FOR HEPATITIS C SCREENING TEST FOR LOW RISK PATIENT: ICD-10-CM

## 2021-05-03 DIAGNOSIS — Z63.4 GRIEF AT LOSS OF CHILD: ICD-10-CM

## 2021-05-03 DIAGNOSIS — Z13.1 SCREENING FOR DIABETES MELLITUS: ICD-10-CM

## 2021-05-03 DIAGNOSIS — N50.811 PAIN IN RIGHT TESTICLE: Primary | ICD-10-CM

## 2021-05-03 DIAGNOSIS — F43.21 GRIEF AT LOSS OF CHILD: ICD-10-CM

## 2021-05-03 DIAGNOSIS — Z13.0 SCREENING, ANEMIA, DEFICIENCY, IRON: ICD-10-CM

## 2021-05-03 DIAGNOSIS — Z12.5 SCREENING FOR PROSTATE CANCER: ICD-10-CM

## 2021-05-03 PROBLEM — R03.0 ELEVATED BLOOD PRESSURE READING WITHOUT DIAGNOSIS OF HYPERTENSION: Status: RESOLVED | Noted: 2018-06-19 | Resolved: 2021-05-03

## 2021-05-03 PROCEDURE — 99214 OFFICE O/P EST MOD 30 MIN: CPT | Performed by: FAMILY MEDICINE

## 2021-05-03 RX ORDER — CIPROFLOXACIN 500 MG/1
500 TABLET, FILM COATED ORAL EVERY 12 HOURS SCHEDULED
Qty: 28 TABLET | Refills: 0 | Status: SHIPPED | OUTPATIENT
Start: 2021-05-03 | End: 2021-05-17

## 2021-05-03 SDOH — SOCIAL STABILITY - SOCIAL INSECURITY: DISSAPEARANCE AND DEATH OF FAMILY MEMBER: Z63.4

## 2021-05-03 NOTE — PATIENT INSTRUCTIONS
Inguinal Hernia   WHAT YOU NEED TO KNOW:   An inguinal hernia happens when organs or abdominal tissue push through a weak spot in the abdominal wall  The abdominal wall is made of fat and muscle  It holds the intestines in place  The hernia may contain fluid, tissue from the abdomen, or part of an organ (such as an intestine)  DISCHARGE INSTRUCTIONS:   Return to the emergency department if:   · You have severe abdominal pain with nausea and vomiting  · Your abdomen is larger than usual      · Your hernia gets bigger or is purple or blue  · You see blood in your bowel movements  · You feel weak, dizzy, or faint  Contact your healthcare provider if:   · You have a fever  · You have questions or concerns about your condition or care  Medicine: You may  need the following:  · NSAIDs , such as ibuprofen, help decrease swelling, pain, and fever  NSAIDs can cause stomach bleeding or kidney problems in certain people  If you take blood thinner medicine, always ask your healthcare provider if NSAIDs are safe for you  Always read the medicine label and follow directions  · Take your medicine as directed  Contact your healthcare provider if you think your medicine is not helping or if you have side effects  Tell him or her if you are allergic to any medicine  Keep a list of the medicines, vitamins, and herbs you take  Include the amounts, and when and why you take them  Bring the list or the pill bottles to follow-up visits  Carry your medicine list with you in case of an emergency  Follow up with your healthcare provider as directed:  Write down your questions so you remember to ask them during your visits  Manage your symptoms and prevent another hernia:   · Do not lift anything heavy  Heavy lifting can make your hernia worse or cause another hernia  Ask your healthcare provider how much is safe for you to lift  · Drink liquids as directed    Liquids may prevent constipation and straining during a bowel movement  Ask how much liquid to drink each day and which liquids are best for you  · Eat foods high in fiber  Fiber may prevent constipation and straining during a bowel movement  Foods that contain fiber include fruits, vegetables, beans, lentils, and whole grains  · Maintain a healthy weight  If you are overweight, weight loss may prevent your hernia from getting worse  It may also prevent another hernia  Talk to your healthcare provider about exercise and how to lose weight safely if you are overweight  · Do not smoke  Nicotine and other chemicals in cigarettes and cigars can weaken the abdominal wall  This may increase your risk for another hernia  Ask your healthcare provider for information if you currently smoke and need help to quit  E-cigarettes or smokeless tobacco still contain nicotine  Talk to your healthcare provider before you use these products  © Copyright [address] Information is for End User's use only and may not be sold, redistributed or otherwise used for commercial purposes  All illustrations and images included in CareNotes® are the copyrighted property of A D A M , Inc  or [address] [place] [address] [facility]  The above information is an  only  It is not intended as medical advice for individual conditions or treatments  Talk to your doctor, nurse or pharmacist before following any medical regimen to see if it is safe and effective for you  Prostatitis   WHAT YOU NEED TO KNOW:   What is prostatitis? Prostatitis is inflammation of the prostate gland  The prostate gland is the male sex gland that produces a fluid that is part of semen  It is about the size of a walnut and is located under the bladder  Prostatitis is not contagious  Prostatitis may be an acute (short-term) or chronic (long-term) condition  You can get prostatitis at any age and more than 1 time  What increases my risk for prostatitis?   Prostatitis may be caused by a bacterial infection or inflammation, or the cause may be unknown  The following may increase your risk:  · Age 27 to [address] years    · A urinary catheter (tube placed in your urethra to drain urine)    · An injury to your genital area, a procedure such as a prostate biopsy, or an enlarged prostate    · Narrowed urethra (tube that carries urine from the bladder) or hardened calcium that forms in the prostate    · Sex that is not protected, or a sexually transmitted infection (STI), such as chlamydia or HIV    · Recent bladder infection, having urinary tract infections often, or urinary retention (trouble urinating)    · Dehydration    What are the signs and symptoms of prostatitis? You may have no signs or symptoms, or you may have any of the following:  · Deep pain in the area between your scrotum and anus or your lower back    · Pain during a bowel movement    · Pain during or right after sexual intercourse    · Burning during urination    · Feeling like you have not emptied your bladder    · Urine does not flow right away when you start to urinate    · Feeling the need to urinate often or right away, especially at night    · Fever, chills, and fatigue    · Blood in your urine or semen    How is prostatitis diagnosed? Your healthcare provider will ask you about your symptoms  He or she will do a physical exam of your prostate  Any of the following may be used to find the cause of your symptoms:  · Blood tests  may be used to check for an infection  · A urine, prostate fluid, or semen sample  may be sent to a lab for tests  · Ultrasound  pictures of your scrotum may be taken if you have pain in your testicles  · CT scan  may be used if you have pain in your abdomen  You may be given contrast liquid to help problems show up better in the pictures  Tell the healthcare provider if you have ever had an allergic reaction to contrast liquid      · A urine volume test  is used to measure the amount of urine left in your bladder after you urinate  Ultrasound pictures or a catheter guided into your bladder may be used for this test     How is prostatitis treated? Treatment will depend on the cause of your prostatitis, your signs and symptoms, and other factors  Prostatitis may be treated with the following:  · Prostate massage  may be used to treat chronic prostatitis  Massage can help decrease fullness and prevent infection  Healthcare providers can teach you how to do a prostate massage  · Alpha blockers  relax the muscles in your prostate and bladder to help you urinate more easily  · Antibiotics  may be given to treat a bacterial infection  · NSAIDs  help decrease swelling and pain or fever  This medicine is available with or without a doctor's order  NSAIDs can cause stomach bleeding or kidney problems in certain people  If you take blood thinner medicine, always ask your healthcare provider if NSAIDs are safe for you  Always read the medicine label and follow directions  · Prescription pain medicine  may be given  Ask your healthcare provider how to take this medicine safely  Some prescription pain medicines contain acetaminophen  Do not take other medicines that contain acetaminophen without talking to your healthcare provider  Too much acetaminophen may cause liver damage  Prescription pain medicine may cause constipation  Ask your healthcare provider how to prevent or treat constipation  · Surgery  may be used if other kinds of treatment do not work  Surgery may be used to drain an abscess  Less commonly, surgery called transurethral resection of the prostate (TURP) may be used to make your prostate smaller  What can I do to manage prostatitis? · Place a heating pad on the area  Heat may help blood flow to your prostate area  Warm baths may decrease prostate fullness and discomfort  · Drink plenty of liquids to prevent dehydration    Ask your healthcare provider how much liquid to drink each day and which liquids are best for you  · Do not drink alcohol or eat spicy foods  until you have finished treatment for prostatitis  Limit the amount of caffeine you drink  · Urinate often  Do not wait to urinate  · You may have sex  if you feel well  When should I call my doctor? · You have a fever  · You see blood in your urine  · You cannot urinate  · Your symptoms are getting worse, or they return after you have been treated  · You have questions or concerns about your condition or care  CARE AGREEMENT:   You have the right to help plan your care  Learn about your health condition and how it may be treated  Discuss treatment options with your healthcare providers to decide what care you want to receive  You always have the right to refuse treatment  The above information is an  only  It is not intended as medical advice for individual conditions or treatments  Talk to your doctor, nurse or pharmacist before following any medical regimen to see if it is safe and effective for you  © Copyright [address] Information is for End User's use only and may not be sold, redistributed or otherwise used for commercial purposes   All illustrations and images included in CareNotes® are the copyrighted property of A D A M , Inc  or [address] [place] [address]

## 2021-05-03 NOTE — PROGRESS NOTES
BMI Counseling: Body mass index is 36 22 kg/m²  The BMI is above normal  Exercise recommendations include exercising 3-5 times per week  Depression Screening and Follow-up Plan: Patient's depression screening was positive with a PHQ-2 score of 6  Their PHQ-9 score was 18  Patient assessed for underlying major depression  Brief counseling provided and recommend additional follow-up/re-evaluation next office visit  recent death of his son        Subjective:   Chief Complaint   Patient presents with    Testicle Pain     Pt has been experiencing right testcile pain  Today, it is about a 3/10 and yesterday, it was a 10/10  Yesterday, it even hurt to touch  Pt agrees to do colonoscopy and FBW  Also due for annual exam          Patient ID: Carol Bah is a 56 y o  male  The pt is here today c/o right sided testicular pain  Started last week, gradually got worse until yesterday  Yesterday was 10/10, a slight touch was terrible  Today it is much better 3/10  It's even better through the day today  His wife is a nurse and did examine him yesterday but it was do terrible   No masses or bulges that he or his wife noticed  No urinary sx  No f/c  He did notice that it was worse after moving heavy piece of furniture over the weekend that the pain actually started prior to this  He has never had anything like this before              The following portions of the patient's history were reviewed and updated as appropriate: allergies, current medications, past family history, past medical history, past social history, past surgical history and problem list     Review of Systems      Objective:  Vitals:    05/03/21 1505   BP: 130/86   BP Location: Left arm   Patient Position: Sitting   Cuff Size: Large   Pulse: 83   Temp: 98 6 °F (37 °C)   TempSrc: Tympanic   SpO2: 97%   Weight: 110 kg (243 lb 8 oz)   Height: 5' 8 75" (1 746 m)      Physical Exam  Constitutional:       General: He is not in acute distress  Appearance: Normal appearance  He is not ill-appearing  Genitourinary:     Comments: No testicular tenderness, mass, no bulge, no inguinal bulge  Skin:     General: Skin is warm and dry  Findings: No erythema or rash  Neurological:      General: No focal deficit present  Mental Status: He is alert and oriented to person, place, and time  Cranial Nerves: No cranial nerve deficit  Gait: Gait normal    Psychiatric:         Mood and Affect: Mood normal          Behavior: Behavior normal          Thought Content: Thought content normal          Judgment: Judgment normal          Diabetic Foot Exam      Assessment/Plan:     I suspect this may be a hernia but it is difficult to tell as he is no longer having symptoms  I am going to have him get an ultrasound of his scrotum and testicles to be sure there is nothing we are missing  We did discuss the possibility of prostatitis though his pain is almost completely resolved as of now  I did print a prescription for Cipro and if his pain comes back he certainly could try this though again, I do not think this is classic for prostatitis  If his pain does come back  and the ultrasound is normal I would consider a surgery referral to look for hernia  The patient had a relatively high PHQ-9 screen but also just lost his son to overdose  He does not feel he needs any medication for depression, he feels his grief is to be expected  He just does not have the same positive outlook as he did prior to the loss of his son  He has very good support through his [employer]  He will follow-up if things worsen  Patient is due for labs and a physical, labs ordered and he was advised to schedule physical     No problem-specific Assessment & Plan notes found for this encounter  Diagnoses and all orders for this visit:    Pain in right testicle  -     US scrotum and testicles; Future  -     ciprofloxacin (CIPRO) 500 mg tablet;  Take 1 tablet (500 mg total) by mouth every 12 (twelve) hours for 14 days    Grief at loss of child    Screening, anemia, deficiency, iron  -     CBC and differential; Future    Screening for diabetes mellitus  -     Comprehensive metabolic panel; Future    Screening for lipid disorders  -     Lipid panel; Future    Screening for thyroid disorder  -     TSH, 3rd generation with Free T4 reflex; Future    Encounter for hepatitis C screening test for low risk patient  -     Hepatitis C antibody; Future    Screening for prostate cancer  -     PSA Total (Reflex To Free); Future    Screen for colon cancer  -     Ambulatory referral for colonoscopy;  Future

## 2021-05-05 ENCOUNTER — HOSPITAL ENCOUNTER (OUTPATIENT)
Dept: RADIOLOGY | Age: 61
Discharge: HOME | End: 2021-05-05
Payer: COMMERCIAL

## 2021-05-05 DIAGNOSIS — N50.811 PAIN IN RIGHT TESTICLE: ICD-10-CM

## 2021-05-05 PROCEDURE — 76870 US EXAM SCROTUM: CPT

## 2021-05-11 ENCOUNTER — TELEPHONE (OUTPATIENT)
Dept: FAMILY MEDICINE CLINIC | Facility: CLINIC | Age: 61
End: 2021-05-11

## 2021-05-11 NOTE — TELEPHONE ENCOUNTER
----- Message from Jennifer Vee, [address] sent at 5/11/2021  1:49 PM EDT -----  Patient has moderate right sided complex hydrocele  Per Dr Marc Samples note patient noted increased pain after moving heavy furniture  Typically resolve within 6 months however if persistent pain can explore surgical consult  No hernia present

## 2022-06-27 ENCOUNTER — VBI (OUTPATIENT)
Dept: ADMINISTRATIVE | Facility: OTHER | Age: 62
End: 2022-06-27

## 2023-01-30 ENCOUNTER — OFFICE VISIT (OUTPATIENT)
Dept: URGENT CARE | Facility: CLINIC | Age: 63
End: 2023-01-30

## 2023-01-30 VITALS
TEMPERATURE: 97.9 F | HEART RATE: 74 BPM | DIASTOLIC BLOOD PRESSURE: 90 MMHG | BODY MASS INDEX: 36.3 KG/M2 | RESPIRATION RATE: 18 BRPM | OXYGEN SATURATION: 98 % | WEIGHT: 244 LBS | SYSTOLIC BLOOD PRESSURE: 140 MMHG

## 2023-01-30 DIAGNOSIS — R30.0 DYSURIA: Primary | ICD-10-CM

## 2023-01-30 LAB
SL AMB  POCT GLUCOSE, UA: NORMAL
SL AMB LEUKOCYTE ESTERASE,UA: NORMAL
SL AMB POCT BILIRUBIN,UA: 0.2
SL AMB POCT BLOOD,UA: NORMAL
SL AMB POCT CLARITY,UA: CLEAR
SL AMB POCT COLOR,UA: NORMAL
SL AMB POCT KETONES,UA: NORMAL
SL AMB POCT NITRITE,UA: NORMAL
SL AMB POCT PH,UA: 5
SL AMB POCT SPECIFIC GRAVITY,UA: 1000
SL AMB POCT URINE PROTEIN: NORMAL
SL AMB POCT UROBILINOGEN: NORMAL
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
## 2023-01-31 LAB
BACTERIA UR CULT: NORMAL
C TRACH DNA SPEC QL NAA+PROBE: NEGATIVE
N GONORRHOEA DNA SPEC QL NAA+PROBE: NEGATIVE

## 2023-02-01 NOTE — TELEPHONE ENCOUNTER
Harry Chavarria is a 71 year old male presenting for   Chief Complaint   Patient presents with   • Medicare Wellness Visit     Denies Latex allergy or sensitivity.    Medication verified and med list updated  Refills needed today: Yes    Health Maintenance Due   Topic Date Due   • Diabetes Foot Exam  Never done   • Shingles Vaccine (1 of 2) Never done   • Abdominal Aortic Aneurysm (AAA) Screening  Never done   • COVID-19 Vaccine (2 - Booster for Brittany series) 05/10/2021   • Diabetes A1C  07/20/2022   • DTaP/Tdap/Td Vaccine (2 - Td or Tdap) 08/20/2022   • Medicare Advantage- Medicare Wellness Visit  01/01/2023   • DM/CKD Microalbumin  01/20/2023   • DM/CKD GFR  01/20/2023   • Depression Screening  01/20/2023       Patient is due for topics as listed above but is not proceeding with Immunization(s) COVID-19, Dtap/Tdap/Td and Shingles at this time.       Unaddressed Risk Adjusted HCC Categories and Diagnoses  HCC 19 - Diabetes without Complication  - Unaddressed Dx:Diabetes Mellitus Type 2 Without Retinopathy (Cms/Hcc)      Last lab results:   Hemoglobin A1C (%)   Date Value   01/20/2022 6.5 (H)     Cholesterol (mg/dL)   Date Value   01/20/2022 125     HDL (mg/dL)   Date Value   01/20/2022 62     Triglycerides (mg/dL)   Date Value   01/20/2022 131     LDL (mg/dL)   Date Value   01/20/2022 37     MICROALBUMIN, UA (TTL) (mg/dL)   Date Value   01/17/2020 2.63     Creatinine, Urine (mg/dL)   Date Value   01/20/2022 385.00     Microalbumin/ Creatinine Ratio (mg/g)   Date Value   01/20/2022 9.3     Lab Results   Component Value Date/Time    IFOB NEGATIVE 11/17/2016 08:33 AM                  Depression Screening:  Recent Review Flowsheet Data     Date 1/20/2022    Adult PHQ 2 Score 0    Adult PHQ 2 Interpretation No further screening needed    Little interest or pleasure in activity? Not at all    Feeling down, depressed or hopeless? Not at all           Advance Directives:  on file     Left message to schedule appt